# Patient Record
Sex: FEMALE | URBAN - METROPOLITAN AREA
[De-identification: names, ages, dates, MRNs, and addresses within clinical notes are randomized per-mention and may not be internally consistent; named-entity substitution may affect disease eponyms.]

---

## 2018-05-15 ENCOUNTER — APPOINTMENT (RX ONLY)
Dept: URBAN - METROPOLITAN AREA CLINIC 132 | Facility: CLINIC | Age: 63
Setting detail: DERMATOLOGY
End: 2018-05-15

## 2018-05-15 DIAGNOSIS — Z41.9 ENCOUNTER FOR PROCEDURE FOR PURPOSES OTHER THAN REMEDYING HEALTH STATE, UNSPECIFIED: ICD-10-CM

## 2018-05-15 PROBLEM — J30.1 ALLERGIC RHINITIS DUE TO POLLEN: Status: ACTIVE | Noted: 2018-05-15

## 2018-05-15 PROCEDURE — ? FILLERS

## 2018-05-15 PROCEDURE — ? BOTOX

## 2018-05-15 ASSESSMENT — LOCATION DETAILED DESCRIPTION DERM: LOCATION DETAILED: LEFT CENTRAL EYEBROW

## 2018-05-15 ASSESSMENT — LOCATION SIMPLE DESCRIPTION DERM: LOCATION SIMPLE: LEFT EYEBROW

## 2018-05-15 ASSESSMENT — LOCATION ZONE DERM: LOCATION ZONE: FACE

## 2018-05-15 NOTE — PROCEDURE: FILLERS
Post-Care Instructions: Patient instructed to apply ice to reduce swelling. If any painful bruising presents call the office.

## 2018-05-15 NOTE — PROCEDURE: FILLERS
Price (Use Numbers Only, No Special Characters Or $): 942 Price (Use Numbers Only, No Special Characters Or $): 426

## 2019-04-02 ENCOUNTER — APPOINTMENT (RX ONLY)
Dept: URBAN - METROPOLITAN AREA CLINIC 132 | Facility: CLINIC | Age: 64
Setting detail: DERMATOLOGY
End: 2019-04-02

## 2019-04-02 DIAGNOSIS — L57.8 OTHER SKIN CHANGES DUE TO CHRONIC EXPOSURE TO NONIONIZING RADIATION: ICD-10-CM

## 2019-04-02 DIAGNOSIS — L57.0 ACTINIC KERATOSIS: ICD-10-CM

## 2019-04-02 DIAGNOSIS — D18.0 HEMANGIOMA: ICD-10-CM

## 2019-04-02 DIAGNOSIS — L81.4 OTHER MELANIN HYPERPIGMENTATION: ICD-10-CM

## 2019-04-02 DIAGNOSIS — D22 MELANOCYTIC NEVI: ICD-10-CM

## 2019-04-02 DIAGNOSIS — L82.1 OTHER SEBORRHEIC KERATOSIS: ICD-10-CM

## 2019-04-02 PROBLEM — D18.01 HEMANGIOMA OF SKIN AND SUBCUTANEOUS TISSUE: Status: ACTIVE | Noted: 2019-04-02

## 2019-04-02 PROBLEM — D22.5 MELANOCYTIC NEVI OF TRUNK: Status: ACTIVE | Noted: 2019-04-02

## 2019-04-02 PROCEDURE — ? LIQUID NITROGEN

## 2019-04-02 PROCEDURE — ? COUNSELING

## 2019-04-02 PROCEDURE — 99214 OFFICE O/P EST MOD 30 MIN: CPT | Mod: 25

## 2019-04-02 PROCEDURE — ? SUNSCREEN RECOMMENDATIONS

## 2019-04-02 PROCEDURE — 17000 DESTRUCT PREMALG LESION: CPT

## 2019-04-02 ASSESSMENT — LOCATION SIMPLE DESCRIPTION DERM
LOCATION SIMPLE: LEFT CHEEK
LOCATION SIMPLE: RIGHT FOREARM
LOCATION SIMPLE: LEFT FOREARM
LOCATION SIMPLE: LEFT PRETIBIAL REGION
LOCATION SIMPLE: LEFT LOWER BACK
LOCATION SIMPLE: LEFT UPPER BACK
LOCATION SIMPLE: RIGHT PRETIBIAL REGION
LOCATION SIMPLE: CHEST
LOCATION SIMPLE: NOSE

## 2019-04-02 ASSESSMENT — LOCATION DETAILED DESCRIPTION DERM
LOCATION DETAILED: LEFT PROXIMAL DORSAL FOREARM
LOCATION DETAILED: RIGHT PROXIMAL DORSAL FOREARM
LOCATION DETAILED: LEFT SUPERIOR MEDIAL MIDBACK
LOCATION DETAILED: RIGHT DISTAL PRETIBIAL REGION
LOCATION DETAILED: NASAL DORSUM
LOCATION DETAILED: LOWER STERNUM
LOCATION DETAILED: LEFT PROXIMAL PRETIBIAL REGION
LOCATION DETAILED: LEFT LATERAL MALAR CHEEK
LOCATION DETAILED: LEFT INFERIOR UPPER BACK
LOCATION DETAILED: LEFT SUPERIOR UPPER BACK
LOCATION DETAILED: RIGHT LATERAL SUPERIOR CHEST

## 2019-04-02 ASSESSMENT — LOCATION ZONE DERM
LOCATION ZONE: FACE
LOCATION ZONE: NOSE
LOCATION ZONE: TRUNK
LOCATION ZONE: LEG
LOCATION ZONE: ARM

## 2019-04-02 ASSESSMENT — PAIN INTENSITY VAS: HOW INTENSE IS YOUR PAIN 0 BEING NO PAIN, 10 BEING THE MOST SEVERE PAIN POSSIBLE?: NO PAIN

## 2019-09-04 ENCOUNTER — APPOINTMENT (RX ONLY)
Dept: URBAN - METROPOLITAN AREA CLINIC 132 | Facility: CLINIC | Age: 64
Setting detail: DERMATOLOGY
End: 2019-09-04

## 2019-09-04 DIAGNOSIS — L98.8 OTHER SPECIFIED DISORDERS OF THE SKIN AND SUBCUTANEOUS TISSUE: ICD-10-CM

## 2019-09-04 PROCEDURE — ? ADDITIONAL NOTES

## 2019-09-04 PROCEDURE — ? BOTOX

## 2019-09-04 ASSESSMENT — LOCATION SIMPLE DESCRIPTION DERM: LOCATION SIMPLE: GLABELLA

## 2019-09-04 ASSESSMENT — LOCATION ZONE DERM: LOCATION ZONE: FACE

## 2019-09-04 ASSESSMENT — LOCATION DETAILED DESCRIPTION DERM: LOCATION DETAILED: GLABELLA

## 2019-09-04 NOTE — PROCEDURE: ADDITIONAL NOTES
Detail Level: Simple
Additional Notes: Patient wanted the filler special 75 off but forgot she had a weekend next week. Will reschedule for 2 weeks

## 2019-09-04 NOTE — HPI: COSMETIC (BOTOX)
Have You Had Botox Before?: has had botox
When Was Your Last Botox Treatment?: 05/2018
Additional History: Last visit 20 units ( Botox) Glabella

## 2020-06-04 ENCOUNTER — APPOINTMENT (RX ONLY)
Dept: URBAN - METROPOLITAN AREA CLINIC 132 | Facility: CLINIC | Age: 65
Setting detail: DERMATOLOGY
End: 2020-06-04

## 2020-06-04 DIAGNOSIS — L98.8 OTHER SPECIFIED DISORDERS OF THE SKIN AND SUBCUTANEOUS TISSUE: ICD-10-CM

## 2020-06-04 PROCEDURE — ? FILLERS

## 2020-06-04 PROCEDURE — ? BOTOX

## 2020-06-04 PROCEDURE — ? ADDITIONAL NOTES

## 2020-06-04 NOTE — PROCEDURE: FILLERS
Price (Use Numbers Only, No Special Characters Or $): 790 Price (Use Numbers Only, No Special Characters Or $): 070

## 2020-06-04 NOTE — PROCEDURE: FILLERS
Post-Care Instructions: Patient instructed to avoid significant movement or massage of the treated area. \\nAvoid strenuous exercise for 24 hours. \\nAvoid extensive sun or heat for 72 hours.\\nAvoid consuming excess amounts of alcohol or salts to avoid excess swelling. \\nIf you have swelling you may apply a cool compress for 5-10 minutes each hour.\\nUse Tylenol for discomfort.\\nTry to sleep face up and slightly elevated if you experience swelling.\\nTake Arnica to help the bruising and swelling, start at least 2 days prior to injections.\\nIf you have any further questions or concerns please call us at [phone] or [phone]. Post-Care Instructions: Patient instructed to avoid significant movement or massage of the treated area. \\nAvoid strenuous exercise for 24 hours. \\nAvoid extensive sun or heat for 72 hours.\\nAvoid consuming excess amounts of alcohol or salts to avoid excess swelling. \\nIf you have swelling you may apply a cool compress for 5-10 minutes each hour.\\nUse Tylenol for discomfort.\\nTry to sleep face up and slightly elevated if you experience swelling.\\nTake Arnica to help the bruising and swelling, start at least 2 days prior to injections.\\nIf you have any further questions or concerns please call us at [phone] or [phone].

## 2021-04-08 ENCOUNTER — APPOINTMENT (RX ONLY)
Dept: URBAN - METROPOLITAN AREA CLINIC 132 | Facility: CLINIC | Age: 66
Setting detail: DERMATOLOGY
End: 2021-04-08

## 2021-04-08 DIAGNOSIS — L98.8 OTHER SPECIFIED DISORDERS OF THE SKIN AND SUBCUTANEOUS TISSUE: ICD-10-CM

## 2021-04-08 PROCEDURE — ? BOTOX

## 2021-04-08 PROCEDURE — ? ADDITIONAL NOTES

## 2021-04-08 PROCEDURE — ? FILLERS

## 2021-04-08 ASSESSMENT — LOCATION DETAILED DESCRIPTION DERM
LOCATION DETAILED: LEFT INFERIOR LATERAL NECK
LOCATION DETAILED: LEFT MEDIAL EYEBROW
LOCATION DETAILED: LEFT INFERIOR MEDIAL MALAR CHEEK
LOCATION DETAILED: RIGHT SUPERIOR MEDIAL BUCCAL CHEEK
LOCATION DETAILED: LEFT ORAL COMMISSURE
LOCATION DETAILED: RIGHT INFERIOR ANTERIOR NECK
LOCATION DETAILED: GLABELLA
LOCATION DETAILED: RIGHT MEDIAL EYEBROW
LOCATION DETAILED: LEFT CENTRAL EYEBROW
LOCATION DETAILED: LEFT SUPERIOR MEDIAL BUCCAL CHEEK
LOCATION DETAILED: RIGHT INFERIOR MEDIAL MALAR CHEEK
LOCATION DETAILED: LEFT INFERIOR ANTERIOR NECK
LOCATION DETAILED: RIGHT MEDIAL BUCCAL CHEEK
LOCATION DETAILED: RIGHT CENTRAL EYEBROW

## 2021-04-08 ASSESSMENT — LOCATION SIMPLE DESCRIPTION DERM
LOCATION SIMPLE: RIGHT ANTERIOR NECK
LOCATION SIMPLE: LEFT LIP
LOCATION SIMPLE: LEFT EYEBROW
LOCATION SIMPLE: LEFT ANTERIOR NECK
LOCATION SIMPLE: LEFT CHEEK
LOCATION SIMPLE: GLABELLA
LOCATION SIMPLE: RIGHT CHEEK
LOCATION SIMPLE: RIGHT EYEBROW

## 2021-04-08 ASSESSMENT — LOCATION ZONE DERM
LOCATION ZONE: FACE
LOCATION ZONE: LIP
LOCATION ZONE: NECK

## 2021-04-08 NOTE — PROCEDURE: FILLERS
----- Message from MONA Villarreal sent at 4/10/2018  7:51 AM CDT -----  Chol is relatively stable.   Her calculated CV risk (heart attack or stroke) in the next 10 years is 11% which is too high.  My recommendation is to increase the atorvastatin she is on from 10mg to 20mg     Leandro in 1 year.  Lipid and comp.  Ideally quitting smoking would reduce risk as well.    Diabetes is well controlled.  Great job.  Continue same.  Office visit in 6 months with lab prior    Post-Care Instructions: Patient instructed to avoid significant movement or massage of the treated area. \\nAvoid strenuous exercise for 24 hours. \\nAvoid extensive sun or heat for 72 hours.\\nAvoid consuming excess amounts of alcohol or salts to avoid excess swelling. \\nIf you have swelling you may apply a cool compress for 5-10 minutes each hour.\\nUse Tylenol for discomfort.\\nTry to sleep face up and slightly elevated if you experience swelling.\\nTake Arnica to help the bruising and swelling, start at least 2 days prior to injections.\\nIf you have any further questions or concerns please call us at [phone] or [phone]. Post-Care Instructions: Patient instructed to avoid significant movement or massage of the treated area. \\nAvoid strenuous exercise for 24 hours. \\nAvoid extensive sun or heat for 72 hours.\\nAvoid consuming excess amounts of alcohol or salts to avoid excess swelling. \\nIf you have swelling you may apply a cool compress for 5-10 minutes each hour.\\nUse Tylenol for discomfort.\\nTry to sleep face up and slightly elevated if you experience swelling.\\nTake Arnica to help the bruising and swelling, start at least 2 days prior to injections.\\nIf you have any further questions or concerns please call us at [phone] or [phone].

## 2021-04-08 NOTE — PROCEDURE: FILLERS
Price (Use Numbers Only, No Special Characters Or $): 595 Price (Use Numbers Only, No Special Characters Or $): 433

## 2021-04-08 NOTE — PROCEDURE: BOTOX
Price (Use Numbers Only, No Special Characters Or $): 386 Price (Use Numbers Only, No Special Characters Or $): 667

## 2021-05-05 ENCOUNTER — APPOINTMENT (RX ONLY)
Dept: URBAN - METROPOLITAN AREA CLINIC 132 | Facility: CLINIC | Age: 66
Setting detail: DERMATOLOGY
End: 2021-05-05

## 2021-05-05 DIAGNOSIS — Z41.9 ENCOUNTER FOR PROCEDURE FOR PURPOSES OTHER THAN REMEDYING HEALTH STATE, UNSPECIFIED: ICD-10-CM

## 2021-05-05 PROCEDURE — ? ADDITIONAL NOTES

## 2021-05-05 PROCEDURE — ? COSMETIC FOLLOW-UP

## 2022-03-17 ENCOUNTER — APPOINTMENT (RX ONLY)
Dept: URBAN - METROPOLITAN AREA CLINIC 132 | Facility: CLINIC | Age: 67
Setting detail: DERMATOLOGY
End: 2022-03-17

## 2022-03-17 DIAGNOSIS — L98.8 OTHER SPECIFIED DISORDERS OF THE SKIN AND SUBCUTANEOUS TISSUE: ICD-10-CM

## 2022-03-17 PROCEDURE — ? ADDITIONAL NOTES

## 2022-03-17 PROCEDURE — ? FILLERS

## 2022-03-17 PROCEDURE — ? BOTOX

## 2022-03-17 ASSESSMENT — LOCATION SIMPLE DESCRIPTION DERM
LOCATION SIMPLE: GLABELLA
LOCATION SIMPLE: LEFT ANTERIOR NECK
LOCATION SIMPLE: RIGHT ANTERIOR NECK
LOCATION SIMPLE: RIGHT EYEBROW
LOCATION SIMPLE: LEFT LIP
LOCATION SIMPLE: LEFT EYEBROW
LOCATION SIMPLE: LEFT CHEEK
LOCATION SIMPLE: RIGHT CHEEK

## 2022-03-17 ASSESSMENT — LOCATION DETAILED DESCRIPTION DERM
LOCATION DETAILED: LEFT INFERIOR MEDIAL MALAR CHEEK
LOCATION DETAILED: RIGHT MEDIAL EYEBROW
LOCATION DETAILED: LEFT ORAL COMMISSURE
LOCATION DETAILED: RIGHT INFERIOR MEDIAL MALAR CHEEK
LOCATION DETAILED: LEFT MEDIAL EYEBROW
LOCATION DETAILED: LEFT CENTRAL EYEBROW
LOCATION DETAILED: LEFT INFERIOR LATERAL NECK
LOCATION DETAILED: GLABELLA
LOCATION DETAILED: RIGHT CENTRAL EYEBROW
LOCATION DETAILED: LEFT INFERIOR ANTERIOR NECK
LOCATION DETAILED: RIGHT MEDIAL BUCCAL CHEEK
LOCATION DETAILED: RIGHT INFERIOR ANTERIOR NECK
LOCATION DETAILED: LEFT SUPERIOR MEDIAL BUCCAL CHEEK
LOCATION DETAILED: RIGHT SUPERIOR MEDIAL BUCCAL CHEEK

## 2022-03-17 ASSESSMENT — LOCATION ZONE DERM
LOCATION ZONE: FACE
LOCATION ZONE: NECK
LOCATION ZONE: LIP

## 2022-03-17 NOTE — PROCEDURE: FILLERS
Price (Use Numbers Only, No Special Characters Or $): 177 Price (Use Numbers Only, No Special Characters Or $): 174

## 2022-03-17 NOTE — PROCEDURE: FILLERS
Post-Care Instructions: Patient instructed to avoid significant movement or massage of the treated area. \\nAvoid strenuous exercise for 24 hours. \\nAvoid extensive sun or heat for 72 hours.\\nAvoid consuming excess amounts of alcohol or salts to avoid excess swelling. \\nIf you have swelling you may apply a cool compress for 5-10 minutes each hour.\\nUse Tylenol for discomfort.\\nTry to sleep face up and slightly elevated if you experience swelling.\\nTake Arnica to help the bruising and swelling, start at least 2 days prior to injections.\\nIf you have any further questions or concerns please call us at [phone] or [phone]. Post-Care Instructions: Patient instructed to avoid significant movement or massage of the treated area. \\nAvoid strenuous exercise for 24 hours. \\nAvoid extensive sun or heat for 72 hours.\\nAvoid consuming excess amounts of alcohol or salts to avoid excess swelling. \\nIf you have swelling you may apply a cool compress for 5-10 minutes each hour.\\nUse Tylenol for discomfort.\\nTry to sleep face up and slightly elevated if you experience swelling.\\nTake Arnica to help the bruising and swelling, start at least 2 days prior to injections.\\nIf you have any further questions or concerns please call us at [phone] or [phone].

## 2022-04-13 ENCOUNTER — APPOINTMENT (RX ONLY)
Dept: URBAN - METROPOLITAN AREA CLINIC 132 | Facility: CLINIC | Age: 67
Setting detail: DERMATOLOGY
End: 2022-04-13

## 2022-04-13 DIAGNOSIS — Z41.9 ENCOUNTER FOR PROCEDURE FOR PURPOSES OTHER THAN REMEDYING HEALTH STATE, UNSPECIFIED: ICD-10-CM

## 2022-04-13 PROCEDURE — ? ADDITIONAL NOTES

## 2022-04-13 PROCEDURE — ? COSMETIC FOLLOW-UP

## 2022-04-13 NOTE — HPI: COSMETIC FOLLOW UP
How Did You Tolerate The Procedure?: well, without problems
What Condition Are We Treating?: Rhytides
What Procedure Did We Perform At The Last Visit?: Botox

## 2022-04-13 NOTE — PROCEDURE: COSMETIC FOLLOW-UP
Comments (Free Text): Botox A Injection Sites:\\nGlabellar Complex - 5 units\\nDilution: 1U/0.1cc\\nLot Number: [id number]\\nReconstitution Date: 4/13/22\\nExpiration Date: 05/2024\\nWritten consent obtained. Risks include but not limited to lid/brow ptosis, bruising, swelling, diplopia,\\ntemporary effect, incomplete chemical denervation. Patient instructed to not lie down for 4 hours and\\nlimit physical activity for 24 hours. Patient instructed not to travel by airplane for 48 hours.\\nNeurotoxins can take 2-10 days to kick in. Advised patient to call the office if any side effects,\\nquestions, or concerns present. Comments (Free Text): Botox A Injection Sites:\\nGlabellar Complex - 5 units\\nDilution: 1U/0.1cc\\nLot Number: [id number]\\nReconstitution Date: 4/13/22\\nExpiration Date: 05/2024\\nWritten consent obtained. Risks include but not limited to lid/brow ptosis, bruising, swelling, diplopia,\\ntemporary effect, incomplete chemical denervation. Patient instructed to not lie down for 4 hours and\\nlimit physical activity for 24 hours. Patient instructed not to travel by airplane for 48 hours.\\nNeurotoxins can take 2-10 days to kick in. Advised patient to call the office if any side effects,\\nquestions, or concerns present.

## 2022-09-08 ENCOUNTER — APPOINTMENT (RX ONLY)
Dept: URBAN - METROPOLITAN AREA CLINIC 132 | Facility: CLINIC | Age: 67
Setting detail: DERMATOLOGY
End: 2022-09-08

## 2022-09-08 DIAGNOSIS — L98.8 OTHER SPECIFIED DISORDERS OF THE SKIN AND SUBCUTANEOUS TISSUE: ICD-10-CM

## 2022-09-08 PROCEDURE — ? BOTOX

## 2022-09-08 PROCEDURE — ? ADDITIONAL NOTES

## 2022-09-08 ASSESSMENT — LOCATION DETAILED DESCRIPTION DERM
LOCATION DETAILED: LEFT CENTRAL EYEBROW
LOCATION DETAILED: LEFT INFERIOR LATERAL NECK
LOCATION DETAILED: GLABELLA
LOCATION DETAILED: RIGHT INFERIOR ANTERIOR NECK
LOCATION DETAILED: RIGHT CENTRAL EYEBROW
LOCATION DETAILED: LEFT MEDIAL EYEBROW

## 2022-09-08 ASSESSMENT — LOCATION ZONE DERM
LOCATION ZONE: NECK
LOCATION ZONE: FACE

## 2022-09-08 ASSESSMENT — LOCATION SIMPLE DESCRIPTION DERM
LOCATION SIMPLE: RIGHT ANTERIOR NECK
LOCATION SIMPLE: RIGHT EYEBROW
LOCATION SIMPLE: LEFT EYEBROW
LOCATION SIMPLE: GLABELLA
LOCATION SIMPLE: LEFT ANTERIOR NECK

## 2022-09-08 NOTE — PROCEDURE: BOTOX
Price (Use Numbers Only, No Special Characters Or $): 880 Price (Use Numbers Only, No Special Characters Or $): 759

## 2023-05-11 ENCOUNTER — APPOINTMENT (RX ONLY)
Dept: URBAN - METROPOLITAN AREA CLINIC 132 | Facility: CLINIC | Age: 68
Setting detail: DERMATOLOGY
End: 2023-05-11

## 2023-05-11 DIAGNOSIS — L98.8 OTHER SPECIFIED DISORDERS OF THE SKIN AND SUBCUTANEOUS TISSUE: ICD-10-CM

## 2023-05-11 PROCEDURE — ? FILLERS

## 2023-05-11 PROCEDURE — ? ADDITIONAL NOTES

## 2023-05-11 PROCEDURE — ? BOTOX

## 2023-05-11 ASSESSMENT — LOCATION DETAILED DESCRIPTION DERM
LOCATION DETAILED: LEFT INFERIOR LATERAL NECK
LOCATION DETAILED: LEFT ORAL COMMISSURE
LOCATION DETAILED: GLABELLA

## 2023-05-11 ASSESSMENT — LOCATION ZONE DERM
LOCATION ZONE: NECK
LOCATION ZONE: FACE
LOCATION ZONE: LIP

## 2023-05-11 ASSESSMENT — LOCATION SIMPLE DESCRIPTION DERM
LOCATION SIMPLE: GLABELLA
LOCATION SIMPLE: LEFT LIP
LOCATION SIMPLE: LEFT ANTERIOR NECK

## 2023-05-11 NOTE — PROCEDURE: FILLERS
Price (Use Numbers Only, No Special Characters Or $): 180 Price (Use Numbers Only, No Special Characters Or $): 792

## 2023-05-11 NOTE — PROCEDURE: BOTOX
Price (Use Numbers Only, No Special Characters Or $): 054 Price (Use Numbers Only, No Special Characters Or $): 260

## 2023-05-11 NOTE — PROCEDURE: BOTOX
diffficulty breathing, not eating well Consent: Written consent obtained. Risks include but not limited to lid/brow ptosis, bruising, swelling, diplopia, temporary effect, incomplete chemical denervation.

## 2023-05-11 NOTE — PROCEDURE: ADDITIONAL NOTES
Additional Notes: After discussion of the risks, benefits and limitations with respect to this Telehealth visit, including potential limitations in picture and video quality, the patient has given verbal consent to proceed with this Telehealth visit. Interactive audio and video telecommunications were used to permit real-time communication between myself and the patient.  This Telehealth visit was performed due to the national COVID-19 emergency and recommended social distancing.
Detail Level: Simple
Detail Level: Detailed
Additional Notes: $12/unit special
Render Risk Assessment In Note?: no

## 2023-05-11 NOTE — PROCEDURE: FILLERS
Post-Care Instructions: Patient instructed to avoid significant movement or massage of the treated area. \\nAvoid strenuous exercise for 24 hours. \\nAvoid extensive sun or heat for 72 hours.\\nAvoid consuming excess amounts of alcohol or salts to avoid excess swelling. \\nIf you have swelling you may apply a cool compress for 5-10 minutes each hour.\\nUse Tylenol for discomfort.\\nTry to sleep face up and slightly elevated if you experience swelling.\\nTake Arnica to help the bruising and swelling, start at least 2 days prior to injections.\\nIf you have any further questions or concerns please call us at [phone] or [phone]. Post-Care Instructions: Patient instructed to avoid significant movement or massage of the treated area. \\nAvoid strenuous exercise for 24 hours. \\nAvoid extensive sun or heat for 72 hours.\\nAvoid consuming excess amounts of alcohol or salts to avoid excess swelling. \\nIf you have swelling you may apply a cool compress for 5-10 minutes each hour.\\nUse Tylenol for discomfort.\\nTry to sleep face up and slightly elevated if you experience swelling.\\nTake Arnica to help the bruising and swelling, start at least 2 days prior to injections.\\nIf you have any further questions or concerns please call us at [phone] or [phone].

## 2023-05-16 ENCOUNTER — APPOINTMENT (RX ONLY)
Dept: URBAN - METROPOLITAN AREA CLINIC 132 | Facility: CLINIC | Age: 68
Setting detail: DERMATOLOGY
End: 2023-05-16

## 2023-05-16 DIAGNOSIS — Z41.9 ENCOUNTER FOR PROCEDURE FOR PURPOSES OTHER THAN REMEDYING HEALTH STATE, UNSPECIFIED: ICD-10-CM

## 2023-05-16 PROCEDURE — ? VENUS VERSA IPL

## 2023-05-16 ASSESSMENT — LOCATION ZONE DERM
LOCATION ZONE: TRUNK
LOCATION ZONE: HAND

## 2023-05-16 ASSESSMENT — LOCATION DETAILED DESCRIPTION DERM
LOCATION DETAILED: LEFT MEDIAL SUPERIOR CHEST
LOCATION DETAILED: LEFT RADIAL DORSAL HAND
LOCATION DETAILED: RIGHT MEDIAL SUPERIOR CHEST
LOCATION DETAILED: RIGHT RADIAL DORSAL HAND

## 2023-05-16 ASSESSMENT — LOCATION SIMPLE DESCRIPTION DERM
LOCATION SIMPLE: RIGHT HAND
LOCATION SIMPLE: LEFT HAND
LOCATION SIMPLE: CHEST

## 2023-05-16 NOTE — PROCEDURE: VENUS VERSA IPL
Treatment Number: 1
External Cooling Fan Speed: 0
Detail Level: Detailed
Fluence Units: J/cm2
Pulse Duration (In Milliseconds): 20
Consent: Written consent obtained, risks reviewed including but not limited to crusting, scabbing, blistering, scarring, darker or lighter pigmentary change, bruising, and/or incomplete response. Also use the settings for Andrea III LENGITINES ON D?COLLET? 2 passes
Pulse Mode: single
Pulse Delay (In Milliseconds): 40
Post-Care Instructions: I reviewed with the patient in detail post-care instructions. Patient should stay away from the sun and wear sun protection until treated areas are fully healed.
Skin Type (Optional): IV
Applicator: [facility]
Fluence: 15
Comment: Décolleté and hands lentigines were treated.
Frequency: 1 Hz
Treated Area: medium area

## 2023-07-31 ENCOUNTER — APPOINTMENT (RX ONLY)
Dept: URBAN - METROPOLITAN AREA CLINIC 132 | Facility: CLINIC | Age: 68
Setting detail: DERMATOLOGY
End: 2023-07-31

## 2023-07-31 DIAGNOSIS — Z41.9 ENCOUNTER FOR PROCEDURE FOR PURPOSES OTHER THAN REMEDYING HEALTH STATE, UNSPECIFIED: ICD-10-CM

## 2023-07-31 PROCEDURE — ? VENUS VERSA IPL

## 2023-07-31 ASSESSMENT — LOCATION SIMPLE DESCRIPTION DERM
LOCATION SIMPLE: RIGHT HAND
LOCATION SIMPLE: CHEST
LOCATION SIMPLE: LEFT HAND

## 2023-07-31 ASSESSMENT — LOCATION DETAILED DESCRIPTION DERM
LOCATION DETAILED: RIGHT RADIAL DORSAL HAND
LOCATION DETAILED: UPPER STERNUM
LOCATION DETAILED: LEFT RADIAL DORSAL HAND

## 2023-07-31 ASSESSMENT — LOCATION ZONE DERM
LOCATION ZONE: TRUNK
LOCATION ZONE: HAND

## 2023-07-31 NOTE — PROCEDURE: VENUS VERSA IPL
External Cooling Fan Speed: 0
Applicator: [facility]
Pulse Delay (In Milliseconds): 15
Treatment Number: 1
Coolin
Frequency: 1 Hz
Pulse Duration (In Milliseconds): 20
Treated Area: medium area
Pulse Mode: single
Detail Level: Detailed
Consent: Written consent obtained, risks reviewed including but not limited to crusting, scabbing, blistering, scarring, darker or lighter pigmentary change, bruising, and/or incomplete response. Also use the settings for Andrea III LENGITINES ON D?COLLET? 2 passes
Skin Type (Optional): IV
Fluence Units: J/cm2
Post-Care Instructions: I reviewed with the patient in detail post-care instructions. Patient should stay away from the sun and wear sun protection until treated areas are fully healed.

## 2024-05-08 ENCOUNTER — APPOINTMENT (RX ONLY)
Dept: URBAN - METROPOLITAN AREA CLINIC 132 | Facility: CLINIC | Age: 69
Setting detail: DERMATOLOGY
End: 2024-05-08

## 2024-05-08 DIAGNOSIS — L98.8 OTHER SPECIFIED DISORDERS OF THE SKIN AND SUBCUTANEOUS TISSUE: ICD-10-CM

## 2024-05-08 PROCEDURE — ? BOTOX

## 2024-05-08 NOTE — PROCEDURE: BOTOX
Levator Labii Superioris Units: 0
Show Additional Area 2: Yes
Consent: Written consent obtained. Risks include but not limited to lid/brow ptosis, bruising, swelling, diplopia, temporary effect, incomplete chemical denervation.
Expiration Date (Month Year): 03/2026
Glabellar Complex Units: 20
Additional Area 1 Location: vertical neck lines
Dilution (U/0.1 Cc): 1
Reconstitution Date (Optional): 05/08/24
Detail Level: Zone
Incrementing Botox Units: By 0.5 Units
Lot #: [id number]
Price (Use Numbers Only, No Special Characters Or $): 321
Post-Care Instructions: Patient instructed to not lie down for 4 hours and limit physical activity for 24 hours. Patient instructed not to travel by airplane for 48 hours. Neurotoxins can take 2-10 days to kick in. Advised patient to call the office if any side effects, questions, or concerns present.\\n\\nTIPS AND REMINDERS\\nDO NOT EXERCISE TODAY!\\nRemain in vertical position for 4-6 hours after injections.\\nDo not massage or manipulate areas of injections for at least 4 hours.\\nUse ice packs if swelling occurs at the site of the injections.\\nUse treated muscles (make facial expressions and contract muscles) for the first 1-2 hours after the injection. This makes the treatment more effective.\\nDo not travel by airplane for 48 hours\\nPOSSIBLE SIDE EFFECTS OF BOTOX INJECTIONS\\nThe following minor, minimal and transient side effects may occur: minor local swelling, bruising,\\nheadache, eyelid swelling, mild nausea, and small areas of numbness. These side effects may last\\nup to two weeks.\\nMild drooping of the upper eyelid (ptosis) occurs in about 2% of patients. This is asymptomatic.\\nMost patients are not even aware this ptosis has occurred and it usually resolves in 1-2 weeks\\nwithout any treatment.\\nWHAT TO EXPECT FROM BOTOX INJECTIONS\\nCosmetic changes with reduced action of injected muscles usually will not be apparent for 1-3 days.\\nMaximum benefit usually occurs 14 days after injections.\\nIf additional touchups are necessary, you must wait a minimum of 2 weeks between treatments.\\nWHEN TO RETURN TO THE OFFICE\\nReturn to our office in 2 weeks for post-treatment evaluation.\\nWe suggest re-treatment in about 3 months to maintain maximum benefits.\\nPLEASE CALL OUR OFFICE IF YOU HAVE ANY QUESTIONS OR CONCERNS! WE ARE JUST A PHONE CALL AWAY,READY TO ASSIST YOU -[phone], ext [phone].

## 2024-11-12 ENCOUNTER — APPOINTMENT (RX ONLY)
Dept: URBAN - METROPOLITAN AREA CLINIC 132 | Facility: CLINIC | Age: 69
Setting detail: DERMATOLOGY
End: 2024-11-12

## 2024-11-12 DIAGNOSIS — L98.8 OTHER SPECIFIED DISORDERS OF THE SKIN AND SUBCUTANEOUS TISSUE: ICD-10-CM

## 2024-11-12 PROCEDURE — ? BOTOX

## 2024-11-12 NOTE — PROCEDURE: BOTOX
Depressor Anguli Oris Units: 0
Expiration Date (Month Year): 11/2026
Show Additional Area 1: Yes
Detail Level: Zone
Consent: Written consent obtained. Risks include but not limited to lid/brow ptosis, bruising, swelling, diplopia, temporary effect, incomplete chemical denervation.
Post-Care Instructions: Patient instructed to not lie down for 4 hours and limit physical activity for 24 hours. Patient instructed not to travel by airplane for 48 hours. Neurotoxins can take 2-10 days to kick in. Advised patient to call the office if any side effects, questions, or concerns present.\\n\\nTIPS AND REMINDERS\\nDO NOT EXERCISE TODAY!\\nRemain in vertical position for 4-6 hours after injections.\\nDo not massage or manipulate areas of injections for at least 4 hours.\\nUse ice packs if swelling occurs at the site of the injections.\\nUse treated muscles (make facial expressions and contract muscles) for the first 1-2 hours after the injection. This makes the treatment more effective.\\nDo not travel by airplane for 48 hours\\nPOSSIBLE SIDE EFFECTS OF BOTOX INJECTIONS\\nThe following minor, minimal and transient side effects may occur: minor local swelling, bruising,\\nheadache, eyelid swelling, mild nausea, and small areas of numbness. These side effects may last\\nup to two weeks.\\nMild drooping of the upper eyelid (ptosis) occurs in about 2% of patients. This is asymptomatic.\\nMost patients are not even aware this ptosis has occurred and it usually resolves in 1-2 weeks\\nwithout any treatment.\\nWHAT TO EXPECT FROM BOTOX INJECTIONS\\nCosmetic changes with reduced action of injected muscles usually will not be apparent for 1-3 days.\\nMaximum benefit usually occurs 14 days after injections.\\nIf additional touchups are necessary, you must wait a minimum of 2 weeks between treatments.\\nWHEN TO RETURN TO THE OFFICE\\nReturn to our office in 2 weeks for post-treatment evaluation.\\nWe suggest re-treatment in about 3 months to maintain maximum benefits.\\nPLEASE CALL OUR OFFICE IF YOU HAVE ANY QUESTIONS OR CONCERNS! WE ARE JUST A PHONE CALL AWAY,READY TO ASSIST YOU -[phone], ext [phone].
Incrementing Botox Units: By 0.5 Units
Price (Use Numbers Only, No Special Characters Or $): 433
Lot #: [id number]
Glabellar Complex Units: 20
Reconstitution Date (Optional): 11/11/24
Dilution (U/0.1 Cc): 1

## 2025-06-19 ENCOUNTER — APPOINTMENT (OUTPATIENT)
Dept: URBAN - METROPOLITAN AREA CLINIC 132 | Facility: CLINIC | Age: 70
Setting detail: DERMATOLOGY
End: 2025-06-19

## 2025-06-19 DIAGNOSIS — L98.8 OTHER SPECIFIED DISORDERS OF THE SKIN AND SUBCUTANEOUS TISSUE: ICD-10-CM

## 2025-06-19 PROCEDURE — ? BOTOX

## 2025-06-19 NOTE — PROCEDURE: BOTOX
Levator Labii Superioris Units: 0
Show Additional Area 2: Yes
Consent: Written consent obtained. Risks include but not limited to lid/brow ptosis, bruising, swelling, diplopia, temporary effect, incomplete chemical denervation.
Expiration Date (Month Year): 05/2027
Glabellar Complex Units: 20
Anterior Platysmal Bands Units: 14
Additional Area 1 Location: Robina lift
Additional Area 1 Units: 8
Dilution (U/0.1 Cc): 1
Reconstitution Date (Optional): 06/16/25
Detail Level: Zone
Incrementing Botox Units: By 0.5 Units
Lot #: [id number]
Price (Use Numbers Only, No Special Characters Or $): 753
Post-Care Instructions: Patient instructed to not lie down for 4 hours and limit physical activity for 24 hours. Patient instructed not to travel by airplane for 48 hours. Neurotoxins can take 2-10 days to kick in. Advised patient to call the office if any side effects, questions, or concerns present.\\n\\nTIPS AND REMINDERS\\nDO NOT EXERCISE TODAY!\\nRemain in vertical position for 4-6 hours after injections.\\nDo not massage or manipulate areas of injections for at least 4 hours.\\nUse ice packs if swelling occurs at the site of the injections.\\nUse treated muscles (make facial expressions and contract muscles) for the first 1-2 hours after the injection. This makes the treatment more effective.\\nDo not travel by airplane for 48 hours\\nPOSSIBLE SIDE EFFECTS OF BOTOX INJECTIONS\\nThe following minor, minimal and transient side effects may occur: minor local swelling, bruising,\\nheadache, eyelid swelling, mild nausea, and small areas of numbness. These side effects may last\\nup to two weeks.\\nMild drooping of the upper eyelid (ptosis) occurs in about 2% of patients. This is asymptomatic.\\nMost patients are not even aware this ptosis has occurred and it usually resolves in 1-2 weeks\\nwithout any treatment.\\nWHAT TO EXPECT FROM BOTOX INJECTIONS\\nCosmetic changes with reduced action of injected muscles usually will not be apparent for 1-3 days.\\nMaximum benefit usually occurs 14 days after injections.\\nIf additional touchups are necessary, you must wait a minimum of 2 weeks between treatments.\\nWHEN TO RETURN TO THE OFFICE\\nReturn to our office in 2 weeks for post-treatment evaluation.\\nWe suggest re-treatment in about 3 months to maintain maximum benefits.\\nPLEASE CALL OUR OFFICE IF YOU HAVE ANY QUESTIONS OR CONCERNS! WE ARE JUST A PHONE CALL AWAY,READY TO ASSIST YOU -[phone], ext [phone].